# Patient Record
Sex: FEMALE | Race: WHITE | HISPANIC OR LATINO | URBAN - METROPOLITAN AREA
[De-identification: names, ages, dates, MRNs, and addresses within clinical notes are randomized per-mention and may not be internally consistent; named-entity substitution may affect disease eponyms.]

---

## 2024-11-25 ENCOUNTER — OFFICE VISIT (OUTPATIENT)
Dept: URBAN - METROPOLITAN AREA CLINIC 70 | Facility: CLINIC | Age: 54
End: 2024-11-25
Payer: COMMERCIAL

## 2024-11-25 ENCOUNTER — DASHBOARD ENCOUNTERS (OUTPATIENT)
Age: 54
End: 2024-11-25

## 2024-11-25 ENCOUNTER — LAB OUTSIDE AN ENCOUNTER (OUTPATIENT)
Dept: URBAN - METROPOLITAN AREA CLINIC 70 | Facility: CLINIC | Age: 54
End: 2024-11-25

## 2024-11-25 VITALS
HEIGHT: 59 IN | SYSTOLIC BLOOD PRESSURE: 126 MMHG | DIASTOLIC BLOOD PRESSURE: 79 MMHG | HEART RATE: 74 BPM | BODY MASS INDEX: 33.95 KG/M2 | TEMPERATURE: 97.9 F | WEIGHT: 168.4 LBS | OXYGEN SATURATION: 97 %

## 2024-11-25 DIAGNOSIS — R10.84 GENERALIZED ABDOMINAL PAIN: ICD-10-CM

## 2024-11-25 DIAGNOSIS — Z12.11 COLON CANCER SCREENING: ICD-10-CM

## 2024-11-25 DIAGNOSIS — K64.4 HEMORRHOIDS, EXTERNAL: ICD-10-CM

## 2024-11-25 DIAGNOSIS — K59.00 CONSTIPATION, UNSPECIFIED CONSTIPATION TYPE: ICD-10-CM

## 2024-11-25 DIAGNOSIS — K21.9 GASTRIC REFLUX: ICD-10-CM

## 2024-11-25 PROBLEM — 225587003: Status: ACTIVE | Noted: 2024-11-25

## 2024-11-25 PROBLEM — 14760008: Status: ACTIVE | Noted: 2024-11-25

## 2024-11-25 PROBLEM — 23913003: Status: ACTIVE | Noted: 2024-11-25

## 2024-11-25 PROCEDURE — 99204 OFFICE O/P NEW MOD 45 MIN: CPT | Performed by: NURSE PRACTITIONER

## 2024-11-25 RX ORDER — GABAPENTIN 300 MG/1
1 CAPSULE CAPSULE ORAL ONCE A DAY
Status: ACTIVE | COMMUNITY

## 2024-11-25 RX ORDER — METFORMIN HYDROCHLORIDE 500 MG/1
1 TABLET WITH A MEAL TABLET, FILM COATED ORAL ONCE A DAY
Status: ACTIVE | COMMUNITY

## 2024-11-25 RX ORDER — PIOGLITAZONE 30 MG/1
1 TABLET TABLET ORAL ONCE A DAY
Status: DISCONTINUED | COMMUNITY

## 2024-11-25 RX ORDER — GLIMEPIRIDE 1 MG/1
1 TABLET WITH BREAKFAST OR THE FIRST MAIN MEAL OF THE DAY TABLET ORAL ONCE A DAY
Status: ACTIVE | COMMUNITY

## 2024-11-25 RX ORDER — DICLOFENAC SODIUM 75 MG/1
1 TABLET AS NEEDED TABLET, DELAYED RELEASE ORAL TWICE A DAY
Status: DISCONTINUED | COMMUNITY

## 2024-11-25 RX ORDER — OMEPRAZOLE 20 MG/1
1 CAPSULE 1/2 TO 1 HOUR BEFORE MORNING MEAL CAPSULE, DELAYED RELEASE ORAL ONCE A DAY
OUTPATIENT

## 2024-11-25 RX ORDER — OMEPRAZOLE 20 MG/1
1 CAPSULE 1/2 TO 1 HOUR BEFORE MORNING MEAL CAPSULE, DELAYED RELEASE ORAL ONCE A DAY
Status: ACTIVE | COMMUNITY

## 2024-11-25 RX ORDER — LOSARTAN POTASSIUM AND HYDROCHLOROTHIAZIDE 100; 25 MG/1; MG/1
1 TABLET TABLET, FILM COATED ORAL ONCE A DAY
Status: ACTIVE | COMMUNITY

## 2024-11-25 RX ORDER — ATORVASTATIN CALCIUM 20 MG/1
1 TABLET TABLET, FILM COATED ORAL ONCE A DAY
Status: ACTIVE | COMMUNITY

## 2024-11-25 RX ORDER — METHOCARBAMOL 750 MG/1
1 TABLET TABLET ORAL
Status: DISCONTINUED | COMMUNITY

## 2024-11-25 NOTE — HPI-TODAY'S VISIT:
11/25/24: 54 year old female presents with C/O external hemorrhoids that are not painful or bleeding, just cause some itching. Constipation that she is not taking anything to manage, reports a BM 1-2 times per week, #3-4 on BSS. Needs screening colonoscopy, no previous colon. Denies bleeding, weight loss, diarrhea, or FMHX of colon cancer.  Reflux PRN that she manages with OTC omeprazole.  Generalized abdominal pain that is improved with BM.

## 2024-12-23 ENCOUNTER — OFFICE VISIT (OUTPATIENT)
Dept: URBAN - METROPOLITAN AREA SURGERY CENTER 24 | Facility: SURGERY CENTER | Age: 54
End: 2024-12-23

## 2025-01-07 ENCOUNTER — OFFICE VISIT (OUTPATIENT)
Dept: URBAN - METROPOLITAN AREA SURGERY CENTER 24 | Facility: SURGERY CENTER | Age: 55
End: 2025-01-07

## 2025-02-03 ENCOUNTER — OFFICE VISIT (OUTPATIENT)
Dept: URBAN - METROPOLITAN AREA SURGERY CENTER 24 | Facility: SURGERY CENTER | Age: 55
End: 2025-02-03